# Patient Record
Sex: FEMALE
[De-identification: names, ages, dates, MRNs, and addresses within clinical notes are randomized per-mention and may not be internally consistent; named-entity substitution may affect disease eponyms.]

---

## 2023-04-21 PROBLEM — Z00.00 ENCOUNTER FOR PREVENTIVE HEALTH EXAMINATION: Status: ACTIVE | Noted: 2023-04-21

## 2023-04-28 ENCOUNTER — APPOINTMENT (OUTPATIENT)
Dept: THORACIC SURGERY | Facility: CLINIC | Age: 68
End: 2023-04-28
Payer: MEDICARE

## 2023-04-28 PROCEDURE — 99204 OFFICE O/P NEW MOD 45 MIN: CPT

## 2023-05-16 RX ORDER — DEXTROAMPHETAMINE SACCHARATE, AMPHETAMINE ASPARTATE, DEXTROAMPHETAMINE SULFATE, AND AMPHETAMINE SULFATE 3.75; 3.75; 3.75; 3.75 MG/1; MG/1; MG/1; MG/1
15 TABLET ORAL DAILY
Refills: 0 | Status: ACTIVE | COMMUNITY

## 2023-05-16 RX ORDER — LORAZEPAM 0.5 MG/1
0.5 TABLET ORAL 3 TIMES DAILY
Refills: 0 | Status: ACTIVE | COMMUNITY

## 2023-05-16 RX ORDER — LEVOTHYROXINE SODIUM 150 UG/1
150 TABLET ORAL DAILY
Refills: 0 | Status: ACTIVE | COMMUNITY

## 2023-05-17 ENCOUNTER — APPOINTMENT (OUTPATIENT)
Dept: THORACIC SURGERY | Facility: HOSPITAL | Age: 68
End: 2023-05-17

## 2023-05-18 ENCOUNTER — APPOINTMENT (OUTPATIENT)
Dept: THORACIC SURGERY | Facility: CLINIC | Age: 68
End: 2023-05-18
Payer: MEDICARE

## 2023-05-25 ENCOUNTER — APPOINTMENT (OUTPATIENT)
Dept: THORACIC SURGERY | Facility: CLINIC | Age: 68
End: 2023-05-25
Payer: MEDICARE

## 2023-05-25 DIAGNOSIS — K44.9 DIAPHRAGMATIC HERNIA W/OUT OBSTRUCTION OR GANGRENE: ICD-10-CM

## 2023-05-25 PROCEDURE — 99213 OFFICE O/P EST LOW 20 MIN: CPT | Mod: 95

## 2023-05-26 NOTE — ASSESSMENT
[FreeTextEntry1] : Patient is a 68 year old female, BMI 34.9, with a symptomatic hiatal hernia. She is scheduled for repair on . She was seen today by telehealth to discuss surgery. \par \par Patient will undergo EGD, laparoscopic robotic assisted hiatal hernia repair and Toupet fundoplication on . She has a previous surgical history of emergency  by lower midline laparotomy, and a hysterectomy. She has been medically evaluated for surgery. \par \par The expected in hospital recovery was discussed with her as was the post operative recovery. Risks benefits and alternatives were discussed. She understood and will proceed with surgery on . \par \par PLAN\par 1.  EGD, laparoscopic robotic assisted hiatal hernia repair and Toupet fundoplication on 23.

## 2023-05-26 NOTE — HISTORY OF PRESENT ILLNESS
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 She was initially scheduled hiatal hernia repair and fundoplication with Dr. Navas on May 17th, and was scheduled for an esophagram by Dr. Navas's team. She has also completed medical clearance. Dr. Navas is no longer able to complete the procedure and the patient was referred to us for further care. She is in the process of planning to reschedule the procedure for 5/31. \par \par She presents today via telehealth to discuss surgery. \par \par 5/23/2023 esophagram\par Double contrast esophagram is performed demonstrating a large paraesophageal \par hiatal hernia. There is mild gastroesophageal reflux and there is mild distal \par esophagitis. The stomach demonstrates no mass, ulcer or deformity. The duodenal \par bulb appears normal. A 1.25 cm diameter barium pill easily passes from the \par pharynx into the stomach. There is no evidence of obstruction.

## 2023-05-26 NOTE — DATA REVIEWED
[FreeTextEntry1] : 5/23 FLUORO ESOPHAGRAM DOUBLE CONTRAST \par  \par Esophagram: \par   \par HISTORY: Known hiatal hernia. \par   \par A contrast injection series was performed. Preliminary film of the chest reveals \par a large retrocardiac hiatal hernia. There is no pulmonary abnormality seen. The \par cervical spine demonstrate degenerative disc disease at C4-C5. The C6 and C7 \par vertebral bodies are not well seen. \par   \par Double contrast esophagram is performed demonstrating a large paraesophageal \par hiatal hernia. There is mild gastroesophageal reflux and there is mild distal \par esophagitis. The stomach demonstrates no mass, ulcer or deformity. The duodenal \par bulb appears normal. A 1.25 cm diameter barium pill easily passes from the \par pharynx into the stomach. There is no evidence of obstruction. \par   \par IMPRESSION: \par   \par There is a large paraesophageal hiatal hernia, associated with mild \par gastroesophageal reflux, and mild esophagitis. \par   \par There is no mass, ulcer, or deformity. \par   \par Electronically Signed in [employer] By Dr. Enoch Yusuf MD on 5/23/2023 8:55 AM\par

## 2023-05-30 PROBLEM — K44.9 HIATAL HERNIA: Status: ACTIVE | Noted: 2023-05-23

## 2023-05-31 ENCOUNTER — APPOINTMENT (OUTPATIENT)
Dept: THORACIC SURGERY | Facility: HOSPITAL | Age: 68
End: 2023-05-31

## 2023-05-31 ENCOUNTER — RESULT REVIEW (OUTPATIENT)
Age: 68
End: 2023-05-31

## 2023-05-31 ENCOUNTER — TRANSCRIPTION ENCOUNTER (OUTPATIENT)
Age: 68
End: 2023-05-31

## 2023-06-01 ENCOUNTER — RESULT REVIEW (OUTPATIENT)
Age: 68
End: 2023-06-01

## 2023-06-05 ENCOUNTER — TRANSCRIPTION ENCOUNTER (OUTPATIENT)
Age: 68
End: 2023-06-05

## 2023-06-12 ENCOUNTER — APPOINTMENT (OUTPATIENT)
Dept: THORACIC SURGERY | Facility: CLINIC | Age: 68
End: 2023-06-12
Payer: MEDICARE

## 2023-06-12 VITALS
SYSTOLIC BLOOD PRESSURE: 146 MMHG | HEART RATE: 79 BPM | WEIGHT: 200 LBS | OXYGEN SATURATION: 98 % | BODY MASS INDEX: 35.44 KG/M2 | DIASTOLIC BLOOD PRESSURE: 94 MMHG | HEIGHT: 63 IN

## 2023-06-12 PROCEDURE — 99024 POSTOP FOLLOW-UP VISIT: CPT

## 2023-06-12 NOTE — COUNSELING
[FreeTextEntry1] : Hernia Sac Pathology:\par  \par A. GE JUNCTION FAT PAD AND HERNIA SAC  :\par - Mature adipose tissue with congested blood vessels. \par

## 2023-06-12 NOTE — PHYSICAL EXAM
[] : no respiratory distress [Respiration, Rhythm And Depth] : normal respiratory rhythm and effort [Auscultation Breath Sounds / Voice Sounds] : lungs were clear to auscultation bilaterally [Heart Rate And Rhythm] : heart rate was normal and rhythm regular [Heart Sounds] : normal S1 and S2 [Site: ___] : Site: [unfilled] [Clean] : clean [Dry] : dry [Healing Well] : healing well

## 2023-06-12 NOTE — ASSESSMENT
[FreeTextEntry1] : Patient is a 68 year old female that underwent laparoscopic robotic assisted hiatal hernia repair with a Toupet fundoplication on June 5, 2023. She returns today for her first post operative visit. \par \par Overall she is doing very well.  She is tolerating her soft solid diet. Pain is well controlled. She is active. \par \par She is recovering as expected. We will continue her soft solid diet for 4 more weeks and activity restrictions -- ie no heavy lifting. She plans on returning to work in a week and a half. She can drive. \par \par She understood the plan and agreed. She will call with questions or concerns. All questions today were answered. \par \par PLAN\par 1.  Continue soft solid diet\par 2.  No heavy lifting\par 3.  Follow up in 4 weeks

## 2023-06-12 NOTE — REASON FOR VISIT
[de-identified] : OPERATION: \par 1.  Upper endoscopy. \par 2.  Robotic-assisted laparoscopic hiatal hernia repair with posterior Toupet\par     fundoplication. \par 3.  Completion endoscopy.  [de-identified] : 5/31/2023 [de-identified] : This patient is a 68-year-old female who was found to have iron deficiency anemia.  The patient was worked up and found to have a large hiatal hernia.  The patient had an endoscopy recently which showed Levy ulcers that were deemed to be the reason for her low hemoglobin and low iron.  The patient was sent from gastroenterology Dr. Paul Hernández for evaluation of hiatal hernia repair given the bleeding Levy ulcers. She is now s/p robotic assisted hiatal hernia repair w toupet fundoplication 5/31/2023 at [facility] without complication. She now presents for her post operative appointment. She endorses that she is feeling well. She has been advancing her diet as tolerated and has been passing gas. Her pain has improved and she has no postoperative complaints.

## 2023-06-12 NOTE — DISCUSSION/SUMMARY
[Doing Well] : is doing well [Excellent Pain Control] : has excellent pain control [No Sign of Infection] : is showing no signs of infection [2] : 2

## 2023-07-17 ENCOUNTER — APPOINTMENT (OUTPATIENT)
Dept: THORACIC SURGERY | Facility: CLINIC | Age: 68
End: 2023-07-17
Payer: MEDICARE

## 2023-07-17 DIAGNOSIS — R14.0 ABDOMINAL DISTENSION (GASEOUS): ICD-10-CM

## 2023-07-17 PROCEDURE — 99024 POSTOP FOLLOW-UP VISIT: CPT

## 2023-07-17 RX ORDER — METOCLOPRAMIDE 5 MG/1
5 TABLET ORAL
Qty: 28 | Refills: 2 | Status: ACTIVE | COMMUNITY
Start: 2023-07-17 | End: 1900-01-01

## 2023-07-17 NOTE — REASON FOR VISIT
[Home] : at home, [unfilled] , at the time of the visit. [Medical Office: ([facility] [place])___] : at the medical office located in  [Patient] : the patient [de-identified] : 1. Upper endoscopy. \par 2. Robotic-assisted laparoscopic hiatal hernia repair with posterior Toupet\par  fundoplication. \par 3. Completion endoscopy. [de-identified] : 5/31/2023 [de-identified] : Patient is a 68 year old female that underwent laparoscopic robotic assisted hiatal hernia repair with a Toupet fundoplication on June 5, 2023. She returns today for her second post operative visit. \par

## 2023-07-17 NOTE — ASSESSMENT
[FreeTextEntry1] : Patient returns for her second post op visit after laparoscopic, robotic assisted hiatal hernia repair and toupet fundoplication on 5/31/23. \par \par Overall the patient is recovering as expected. She is about 6 weeks out from surgery. She only reports some nausea and gas. Discussed that this can be normal in the post operative course. Will prescribe Reglan that she can trial if she would like. She can return normal activities as tolerated and a general diet. I still encouraged small meals and to chew food thoroughly. She will follow up in 6 weeks.\par \par PLAN\par 1.  Follow up in 6 weeks\par 2.  Reglan called into her pharmacy

## 2023-08-14 ENCOUNTER — APPOINTMENT (OUTPATIENT)
Dept: THORACIC SURGERY | Facility: CLINIC | Age: 68
End: 2023-08-14

## 2023-08-21 ENCOUNTER — APPOINTMENT (OUTPATIENT)
Dept: THORACIC SURGERY | Facility: CLINIC | Age: 68
End: 2023-08-21
Payer: MEDICARE

## 2023-08-21 PROCEDURE — G2012 BRIEF CHECK IN BY MD/QHP: CPT

## 2023-08-21 NOTE — ASSESSMENT
[FreeTextEntry1] : Patient is a 68 year old female who underwent hiatal hernia repair on 5/31/23. She returns today to assess recovery over the phone.  She is doing very well from a surgical standpoint. Reglan was helpful. She has intermittent nausea and gas, but it is infrequent. She is able to tolerate a much more diverse diet than she was prior to surgery. Overall she is very happy with the outcome.   Will plan to follow up in 6 months.   PLAN 1.  6 month follow up

## 2023-08-21 NOTE — REASON FOR VISIT
[Home] : at home, [unfilled] , at the time of the visit. [Medical Office: ([facility])___] : at the medical office located in  [Patient] : the patient [de-identified] : 1. Upper endoscopy.  2. Robotic-assisted laparoscopic hiatal hernia repair with posterior Toupet   fundoplication.  3. Completion endoscopy. and she is here for a post-op visit.  [de-identified] : 5/31/2023 [de-identified] : Patient is a 68 year old female that underwent laparoscopic robotic assisted hiatal hernia repair with a Toupet fundoplication on June 5, 2023. She was seen by Dr. Obrien and was recovering well with some nausea and gas for which she was prescribed reglan at that time. She returns today for her third post operative visit.

## 2023-09-26 ENCOUNTER — NON-APPOINTMENT (OUTPATIENT)
Age: 68
End: 2023-09-26

## 2023-09-27 ENCOUNTER — RESULT REVIEW (OUTPATIENT)
Age: 68
End: 2023-09-27

## 2023-09-27 ENCOUNTER — APPOINTMENT (OUTPATIENT)
Dept: RHEUMATOLOGY | Facility: CLINIC | Age: 68
End: 2023-09-27
Payer: MEDICARE

## 2023-09-27 VITALS
BODY MASS INDEX: 35.44 KG/M2 | HEART RATE: 75 BPM | WEIGHT: 200 LBS | HEIGHT: 63 IN | DIASTOLIC BLOOD PRESSURE: 90 MMHG | SYSTOLIC BLOOD PRESSURE: 130 MMHG | OXYGEN SATURATION: 97 %

## 2023-09-27 DIAGNOSIS — M19.90 UNSPECIFIED OSTEOARTHRITIS, UNSPECIFIED SITE: ICD-10-CM

## 2023-09-27 DIAGNOSIS — M25.559 PAIN IN UNSPECIFIED HIP: ICD-10-CM

## 2023-09-27 DIAGNOSIS — M17.0 BILATERAL PRIMARY OSTEOARTHRITIS OF KNEE: ICD-10-CM

## 2023-09-27 PROCEDURE — 99204 OFFICE O/P NEW MOD 45 MIN: CPT

## 2023-09-27 RX ORDER — DICLOFENAC SODIUM 1% 10 MG/G
1 GEL TOPICAL
Qty: 2 | Refills: 1 | Status: ACTIVE | COMMUNITY
Start: 2023-09-27 | End: 1900-01-01

## 2023-09-28 LAB
ALBUMIN SERPL ELPH-MCNC: 4.6 G/DL
ALP BLD-CCNC: 102 U/L
ALT SERPL-CCNC: 16 U/L
ANION GAP SERPL CALC-SCNC: 14 MMOL/L
AST SERPL-CCNC: 14 U/L
BASOPHILS # BLD AUTO: 0.01 K/UL
BASOPHILS NFR BLD AUTO: 0.1 %
BILIRUB SERPL-MCNC: 0.4 MG/DL
BUN SERPL-MCNC: 34 MG/DL
CALCIUM SERPL-MCNC: 10.2 MG/DL
CCP AB SER IA-ACNC: <8 UNITS
CHLORIDE SERPL-SCNC: 107 MMOL/L
CO2 SERPL-SCNC: 22 MMOL/L
CREAT SERPL-MCNC: 0.98 MG/DL
CRP SERPL-MCNC: <3 MG/L
EGFR: 62 ML/MIN/1.73M2
EOSINOPHIL # BLD AUTO: 0 K/UL
EOSINOPHIL NFR BLD AUTO: 0 %
ERYTHROCYTE [SEDIMENTATION RATE] IN BLOOD BY WESTERGREN METHOD: 12 MM/HR
GLUCOSE SERPL-MCNC: 116 MG/DL
HCT VFR BLD CALC: 43 %
HGB BLD-MCNC: 13.6 G/DL
IMM GRANULOCYTES NFR BLD AUTO: 0.7 %
LYMPHOCYTES # BLD AUTO: 1.22 K/UL
LYMPHOCYTES NFR BLD AUTO: 8.4 %
MAN DIFF?: NORMAL
MCHC RBC-ENTMCNC: 29.8 PG
MCHC RBC-ENTMCNC: 31.6 GM/DL
MCV RBC AUTO: 94.1 FL
MONOCYTES # BLD AUTO: 0.41 K/UL
MONOCYTES NFR BLD AUTO: 2.8 %
NEUTROPHILS # BLD AUTO: 12.75 K/UL
NEUTROPHILS NFR BLD AUTO: 88 %
PLATELET # BLD AUTO: 434 K/UL
POTASSIUM SERPL-SCNC: 5 MMOL/L
PROT SERPL-MCNC: 7.7 G/DL
RBC # BLD: 4.57 M/UL
RBC # FLD: 16.5 %
RF+CCP IGG SER-IMP: NEGATIVE
RHEUMATOID FACT SER QL: <10 IU/ML
SODIUM SERPL-SCNC: 143 MMOL/L
WBC # FLD AUTO: 14.49 K/UL

## 2023-10-02 RX ORDER — TRAMADOL HYDROCHLORIDE 50 MG/1
50 TABLET, COATED ORAL
Qty: 14 | Refills: 0 | Status: COMPLETED | COMMUNITY
Start: 2023-10-02 | End: 2023-10-09

## 2025-08-19 ENCOUNTER — NON-APPOINTMENT (OUTPATIENT)
Age: 70
End: 2025-08-19